# Patient Record
(demographics unavailable — no encounter records)

---

## 2025-01-10 NOTE — PHYSICAL EXAM
[phone]  Spoke to mom who states child had an episode at school where she coughed, chest tightened & she could not catch her breath. Mom made an appointment for 9/24/21-RN advises IC today.  Mom states she will take child to IC today & cancel 9/24/21 appointment later if she chooses not to keep it as a follow up to IC.    Arlin Callejas RN     [Well Developed] : well developed [Well Nourished] : well nourished [No Acute Distress] : no acute distress [Normal Conjunctiva] : normal conjunctiva [Normal Venous Pressure] : normal venous pressure [No Carotid Bruit] : no carotid bruit [Normal S1, S2] : normal S1, S2 [No Murmur] : no murmur [No Rub] : no rub [No Gallop] : no gallop [Clear Lung Fields] : clear lung fields [Good Air Entry] : good air entry [No Respiratory Distress] : no respiratory distress  [Soft] : abdomen soft [Non Tender] : non-tender [No Masses/organomegaly] : no masses/organomegaly [Normal Bowel Sounds] : normal bowel sounds [Normal Gait] : normal gait [No Edema] : no edema [No Cyanosis] : no cyanosis [No Clubbing] : no clubbing [No Varicosities] : no varicosities [No Rash] : no rash [No Skin Lesions] : no skin lesions [Moves all extremities] : moves all extremities [No Focal Deficits] : no focal deficits [Normal Speech] : normal speech [Alert and Oriented] : alert and oriented [Normal memory] : normal memory

## 2025-01-10 NOTE — REASON FOR VISIT
[Symptom and Test Evaluation] : symptom and test evaluation [FreeTextEntry1] : 36F comes in for a visit. Patient has a two new complains. One issue are instances of palpitations. Symptoms noted at rest and intermittent. Symptoms related to waking up from sleep. She has been training for a marathon and noted dizziness toward the end of exercise. No syncope noted, but felt as if she was going to faint a few times.  No recent testing done.

## 2025-01-10 NOTE — DISCUSSION/SUMMARY
[FreeTextEntry1] : EKG section of the chart --- secondary to symptoms above an electrocardiogram also known as an EKG was performed.  Risks and benefits discussed with the patient. Patient was given time and privacy to changed into a gown. Shortly after, standard 10 leads were applied and a iMed[employer] system was used to perform the study. The results were subsequently reviewed by attending physician and discussed with the patient. The study showed a normal sinus rhythm and no ST-T suggestive of ischemia. Order for the EKG was placed in the chart. The results were documented. Billing submitted. Palpitations check a 7 day holter if able to approve and schedule. Dizziness FH CAD cardiac risk stratification check a stress echo if able to approve and schedule. [EKG obtained to assist in diagnosis and management of assessed problem(s)] : EKG obtained to assist in diagnosis and management of assessed problem(s)

## 2025-01-23 NOTE — DISCUSSION/SUMMARY
[FreeTextEntry1] : Palps/abn ekg echo and stress ekg reviewed. Inclined towards a conservative follow up in this patient. We had a careful discussion regarding diet and exercise. Will be happy to re-evaluate. Advised a primary care follow up as well.  No additional testing at the moment

## 2025-01-23 NOTE — REASON FOR VISIT
[Symptom and Test Evaluation] : symptom and test evaluation [FreeTextEntry1] : 36F with history of CAD in the family comes in for a re-evaluation. Patient noted chest pain and palpitations.  She completed a stress test. We are reviewing results.

## 2025-06-23 NOTE — DISCUSSION/SUMMARY
[FreeTextEntry1] : Edema check venous us if able to approve and schedule. Leg fatigue ALEXEI and doppler if able to approve and schedule. CP/RM/palps echo reviewed. Inclined towards a conservative follow up in this patient. We had a careful discussion regarding diet and exercise. Will be happy to re-evaluate.

## 2025-06-23 NOTE — DISCUSSION/SUMMARY
[FreeTextEntry1] : Edema check venous us if able to approve and schedule. Leg fatigue ALEXEI and doppler if able to approve and schedule. CP/MR/palps echo reviewed. Inclined towards a conservative follow up in this patient. We had a careful discussion regarding diet and exercise. Will be happy to re-evaluate.

## 2025-06-23 NOTE — REASON FOR VISIT
[Symptom and Test Evaluation] : symptom and test evaluation [FreeTextEntry1] : 36F with history of CAD in the family comes in for a re-evaluation. Patient noted chest pain and palpitations.  She completed a stress test. We are reviewing results.  Leg fatigue noted. This is often noted with activity. Symptoms always improve at rest Was seen in urgent care.